# Patient Record
(demographics unavailable — no encounter records)

---

## 2024-10-08 NOTE — HISTORY OF PRESENT ILLNESS
[FreeTextEntry1] : New patient wellness examination- comprehensive  [de-identified] : 45 yrs old female here for new patient examination- wellness, comprehensive examination Periods regular but feeling perimenopausal, less bleeding X 1 1/2 yrs, hormones will be checked by gynecologist soon. Also to see GI for occasional abdominal pain/discomfort mainly left sided Sees chiropractor for sciatica, upper back and neck pain.   PSH: 2020: Fibroadenoma left breast  PMH:  : , 2019 2 miscarriages Uterine fibroids Hx Acne Hx HPV + 2018- all subsequent HPV neg Allergies: NKDA No food allergies Medication : No prescriptions Vitamins/Supplements: Multi women's, K2, D2 with calcium, Fiber/prebiotics, probiotics, liquid B12, Mg 200mg QD, Turmeric, D Mannose,  Omega 3, Wild yam cream. Vaccines: No recent SH: Former smoker - stopped .  Light social smoker intermittent X 6 yrs.   ETOH social.  No marijuana FH: Mother breast cancer 47 early stage. PPM. HTN  72  BRCA neg.  Father: DMII HTN 72.   Brother no known problems. Diet: Healthy-home cooked Exercise: Takes long walks. Active  Preventive screening:   Breast imaging : Last mammogram 5 yrs ago.  Due now.   GYN/pelvic imaging:  Last PAP 2024 good TVS negative 2024   Bone density: Not yet   CRC screening:  10/21/24: Endoscopy/colonoscopy pending    Ophthalmology: Shantal 2 yrs good   Cardiac: Stress test 2 yrs ago- good    Labwork: Today  Need for lung cancer screening: Smoker within 15 yrs of 20 +pack yrs/over 50: Neg    Dermatology: Not recent   Dental: Q 6mos    Fall risk: No injuries   Advance directives: Health care proxy

## 2024-10-08 NOTE — PHYSICAL EXAM
[No Acute Distress] : no acute distress [Well Nourished] : well nourished [Well Developed] : well developed [Well-Appearing] : well-appearing [Normal Sclera/Conjunctiva] : normal sclera/conjunctiva [PERRL] : pupils equal round and reactive to light [EOMI] : extraocular movements intact [Normal Outer Ear/Nose] : the outer ears and nose were normal in appearance [Normal Oropharynx] : the oropharynx was normal [No JVD] : no jugular venous distention [No Lymphadenopathy] : no lymphadenopathy [Supple] : supple [Thyroid Normal, No Nodules] : the thyroid was normal and there were no nodules present [No Respiratory Distress] : no respiratory distress  [No Accessory Muscle Use] : no accessory muscle use [Clear to Auscultation] : lungs were clear to auscultation bilaterally [Normal Rate] : normal rate  [Regular Rhythm] : with a regular rhythm [Normal S1, S2] : normal S1 and S2 [No Murmur] : no murmur heard [Pedal Pulses Present] : the pedal pulses are present [No Edema] : there was no peripheral edema [Soft] : abdomen soft [Non Tender] : non-tender [Non-distended] : non-distended [Normal Posterior Cervical Nodes] : no posterior cervical lymphadenopathy [Normal Anterior Cervical Nodes] : no anterior cervical lymphadenopathy [No Rash] : no rash [Coordination Grossly Intact] : coordination grossly intact [No Focal Deficits] : no focal deficits [Normal Gait] : normal gait [Normal Affect] : the affect was normal [Normal Insight/Judgement] : insight and judgment were intact [No Joint Swelling] : no joint swelling [de-identified] : /87  Pulse 99  BMI: 27.2

## 2024-10-08 NOTE — PLAN
[FreeTextEntry1] : 45 years old female here for new patient comprehensive wellness examination. Patient has history of fibroadenoma left breast 2020, family history of breast cancer-mother 47 years old early stage. Patient is now due for breast screening.  Recommend: Screening mammogram and ultrasound breasts due to family history of breast cancer, prior fibroadenoma, dense breasts.  GI workup will be done this month including endoscopy and colonoscopy. GYN regular screening, eye exam and cardiac exam OK. EKG today normal Medications none, NKDA.  Vitamins reviewed with patient.   Diet and exercise discussed Former light smoker. Lab work ordered including CBC, CMP, lipid, TFTs, LFTs, A1c, magnesium, vitamin levels, urine screen. RV 1 year Patient understands instructions and agrees with plan Will contact patient with results of all testing.

## 2025-01-09 NOTE — ASSESSMENT
[FreeTextEntry1] : Patient presents for followup of eosinophilic esophagitis.   She feels better and is doing well on treatment with famotidine.   Moderate degree of complexity of medical decision making involved in this patient encounter

## 2025-01-09 NOTE — HISTORY OF PRESENT ILLNESS
[FreeTextEntry1] : Chief complaint: followup of eosinophilic esophagitis, diverticular disease  HPI: Patient presents for followup of multiple complex Gastrointestinal issues. Patient with history of endoscopically confirmed eosinophilic esophagitis. She is doing well on treatment with famotidine, which she takes in the evening. Much fewer episodes of dyspepsia, no odynophagia, dysphagia or epigastric pain.   As far as the diverticular of the intestine, she has not had any recent episodes of abdominal pain or alteration in bowel habits  Gerd diet reviewed with patient Multiple reports reviewed including endoscopy and histopathology report   Moderate degree of complexity of medical decision making involved in this patient encounter

## 2025-02-27 NOTE — ASSESSMENT
[FreeTextEntry1] : Patient presents with signs and symptoms of acute diverticulitis and llq tenderness.   Low residue diet advised.   new prescription sent to pharmacy for cephalexin.   Moderate degree of complexity of medical decision making involved in this patient encounter.

## 2025-02-27 NOTE — HISTORY OF PRESENT ILLNESS
[FreeTextEntry1] : Chief complaint: abdominal pain, dyspepsia  HPI: Patient presents for followup of multiple complex gastrointestinal signs and symptoms. Complex syndrome of abdominal pain with intermittent crampy llq abdominal pain. There are no exacerbating or ameliorating factors, the abdominal pain is non radiating; associated factors include nausea.   No sign of acute gastrointestinal bleeding such as hematemesis, melena or hematochezia, no dysphagia or odynophagia.   New prescription for cephalexin sent ot pharmacy  Moderate degree of complexity of medical decision making involved in this patient encounter.

## 2025-03-20 NOTE — ASSESSMENT
[FreeTextEntry1] : TREATMENT PLAN  1) New prescription sent to pharmacy for Augmentin  2) New prescription sent to pharmacy for famotidine   3) Order to be placed for Cat scan of abdomen and pelvis   4) Followup visit to be scheduled for 3 to 4 weeks.   Moderate degree of complexity of medical decision making involved in this patient encounter.

## 2025-03-20 NOTE — HISTORY OF PRESENT ILLNESS
[FreeTextEntry1] : Chief complaint: abdominal pain, flare of esophagitis   HPI: Patient presents for Gastroenterology evaluation because of flare of two different Gastro issues; flare of reflux esophagitis and flare of diverticulitis. Patient with acute onset of left sided abdominal pain, with intermittent crampy visceral type llq abdominal pain; there are no exacerbating or ameliorating factors. The abdominal pain is non radiating; associated factors include dyspepsia.   Patient also with acute flare of diverticulitis as well as acute flare of esophagitis.  I reviewed multiple reports with the patient including reports of colonoscopy, endoscopy and histopathology.  New order to be placed for cat scan of abdomen and pelvis.   New prescription sent to pharmacy for famotidine. New prescription sent to pharmacy for Augmentin  Moderate degree of complexity of medical decision making involved in this patient encounter.

## 2025-04-24 NOTE — PHYSICAL EXAM

## 2025-04-24 NOTE — ASSESSMENT
[FreeTextEntry1] : DIAGNOSIS EOSINOPHILIC ESOPHAGITIS DIVERTICULOSIS FLARE OF SPASTIC COLITIS  GERD   Plan gerd diet reviewed with patient  Prescription for famotidine sent to pharmacy I reviewed multiple reports including colonoscopy, endoscopy and histopathology Medications, problem list and allergies were reviewed and reconciled.

## 2025-04-24 NOTE — HISTORY OF PRESENT ILLNESS
[FreeTextEntry1] : Chief  complaint: eosinophilic esophagitis, spastic colitis   HPI: Patient presents for followup of multiple gastrointestinal signs and symptoms. Has some dyspepsia at times worse postprandially, but it is controlled by her acid reflux medications.   Patient also with spastic colon as well.  I reviewed multiple reports with the patient including reports of colonoscopy, endoscopy and histopathology. GERD diet was reviewed with patient.   Patient has been adhering to a low acid, gerd diet.  Medications, problem list and allergies were reviewed and reconciled.